# Patient Record
Sex: FEMALE | Race: BLACK OR AFRICAN AMERICAN
[De-identification: names, ages, dates, MRNs, and addresses within clinical notes are randomized per-mention and may not be internally consistent; named-entity substitution may affect disease eponyms.]

---

## 2020-06-23 ENCOUNTER — HOSPITAL ENCOUNTER (EMERGENCY)
Dept: HOSPITAL 72 - EMR | Age: 70
Discharge: HOME | End: 2020-06-23
Payer: MEDICARE

## 2020-06-23 VITALS — DIASTOLIC BLOOD PRESSURE: 61 MMHG | SYSTOLIC BLOOD PRESSURE: 136 MMHG

## 2020-06-23 VITALS — WEIGHT: 150 LBS | BODY MASS INDEX: 27.6 KG/M2 | HEIGHT: 62 IN

## 2020-06-23 DIAGNOSIS — M06.9: ICD-10-CM
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
## 2020-06-23 DIAGNOSIS — M79.7: ICD-10-CM

## 2020-06-23 DIAGNOSIS — S00.06XA: Primary | ICD-10-CM

## 2020-06-23 PROCEDURE — 99282 EMERGENCY DEPT VISIT SF MDM: CPT

## 2020-06-23 NOTE — EMERGENCY ROOM REPORT
History of Present Illness


General


Chief Complaint:  General Complaint


Source:  Patient





Present Illness


HPI


Patient presents with complaints of irritation to her scalp


Reports that several days ago she was attacked with what she felt was a swarm 

of bees


At that time she felt that she was stung several times


Now more recently she has felt that there is increased irritation throughout 

her scalp she felt that she could feel some of the stingers





Denies any fevers denies any visual changes denies any neck pain or photophobia 

denies any change in her respirations or breathing


Allergies:  


Coded Allergies:  


     ACETAMINOPHEN (Verified  Allergy, Unknown, 12/9/11)


     CODEINE (Verified  Allergy, Unknown, 12/9/11)


     HYDROCODONE (Verified  Allergy, Unknown, 12/9/11)


     HYDROMORPHONE (Verified  Allergy, Unknown, 12/9/11)


     MEPERIDINE (Verified  Allergy, Unknown, 12/9/11)


     NAPROXEN (Verified  Allergy, Unknown, 12/9/11)


     PAROXETINE (Verified  Allergy, Unknown, 12/9/11)


     PENICILLINS (Verified  Allergy, Unknown, 12/9/11)


     PROPOXYPHENE (Verified  Allergy, Unknown, 12/9/11)


     TRAMADOL (Verified  Allergy, Unknown, 12/9/11)





COVID-19 Screening


Contact w/high risk pt:  No


Recent Travel to affected area:  No


Experienced COVID-19 symptoms?:  No


COVID-19 Testing performed PTA:  Yes


COVID-19 Screening:  Negative COVID-19


COVID-19 Testing Source:  [facility]





Patient History


Past Medical History:  see triage record


Pregnant Now:  No


Reviewed Nursing Documentation:  PMH: Agreed; PSxH: Agreed





Nursing Documentation-PMH


Past Medical History:  No History, Except For


Hx Cardiac Problems:  Yes - Fibromyalgia, Rheumatoid Arthritis


Hx Hypertension:  No


Hx Pacemaker:  No


Hx Asthma:  No


Hx COPD:  No


Hx Diabetes:  No


Hx Cancer:  No


Hx Gastrointestinal Problems:  No


Hx Dialysis:  No


History Of Psychiatric Problem:  No


Hx Neurological Problems:  No


Hx Cerebrovascular Accident:  No


Hx Seizures:  No





Review of Systems


All Other Systems:  negative except mentioned in HPI





Physical Exam





Vital Signs








  Date Time  Temp Pulse Resp B/P (MAP) Pulse Ox O2 Delivery O2 Flow Rate FiO2


 


6/23/20 19:56 97.3 95 18 136/61 (86) 97 Room Air  








Sp02 EP Interpretation:  reviewed, normal


General Appearance:  well appearing, no apparent distress


Head:  normocephalic, atraumatic, other - There is some irritation noted to the 

scalp, no obvious fluctuance no obvious foreign bodies that I could see however 

there is a mild erythematous hue and irritated appearance,


Eyes:  bilateral eye PERRL, bilateral eye EOMI


ENT:  hearing grossly normal, normal pharynx, TMs + canals normal, uvula midline


Neck:  full range of motion, supple, no meningismus, no bony tend


Respiratory:  lungs clear, normal breath sounds, no rhonchi, no respiratory 

distress, no retraction, no accessory muscle use


Cardiovascular #1:  normal peripheral pulses, regular rate, rhythm, no edema, 

no gallop, no JVD, no murmur


Gastrointestinal:  normal bowel sounds, non tender, soft, no mass, no 

organomegaly, non-distended, no guarding, no hernia, no pulsatile mass, no 

rebound


Musculoskeletal:  normal inspection


Neurologic:  motor strength/tone normal, oriented x3, sensory intact, responsive


Psychiatric:  mood/affect normal


Skin:  other - As above


Lymphatic:  normal inspection, no adenopathy





Medical Decision Making


Diagnostic Impression:  


 Primary Impression:  


 insect bite


 Additional Impression:  


 local reaction


ER Course


Patient does appear to have some evidence of irritation likely reactionary from 

possible stings





Otherwise does not appear to be systemic patient will have initial conservative 

outpatient trial with medications and follow closely





Last Vital Signs








  Date Time  Temp Pulse Resp B/P (MAP) Pulse Ox O2 Delivery O2 Flow Rate FiO2


 


6/23/20 20:53 97.3 85 18 136/61 97 Room Air  








Status:  unchanged


Disposition:  HOME, SELF-CARE


Condition:  Stable


Scripts


Calamine/Zinc Oxide (CALAMINE LOTION*) 177 Ml Suspension


1 APPLIC TP BID for 7 Days, ML 0 Refills


   Prov: Erlinda Lawrence DO         6/23/20 


Diphenhydramine Hcl* (BENADRYL*) 25 Mg Capsule


25 MG ORAL Q6H PRN for Itching, #20 CAP


   Prov: Erlinda Lawrence DO         6/23/20 


Methylprednisolone (Methylprednisolone*) 4MG Dspk


4 MG ORAL AS DIRECTED for 6 Days, #21 EA 0 Refills


   Day 1: Two tablets before breakfast, one after lunch, one after


   dinner, and two at bedtime. If started late in the day, take all six


   tablets at once or divide into two or three doses, unless otherwise


   directed by prescriber.


   Day 2: One tablet before breakfast, one after lunch, one after dinner,


   and two at bedtime


   Day 3: One tablet before breakfast, one after lunch, one after dinner,


   and one at bedtime


   Day 4: One tablet before breakfast, one after lunch, and one at


   bedtime


   Day 5: One tablet before breakfast and one at bedtime


   Day 6: One tablet before breakfast


   Prov: Erlinda Lawrence DO         6/23/20


Referrals:  


[facility]


Patient Instructions:  Contact Dermatitis, Easy-to-Read, Insect Bite, Easy-to-

Read





Additional Instructions:  


Patient is provided with the discharge instructions notified to follow up with 

primary doctor in the next 2-3 days otherwise return to the er with any 

worsening symptoms.


Please note that this report is being documented using DRAGON technology.  This 

can lead to erroneous entry secondary to incorrect interpretation by the 

dictating instrument.











Erlinda Lawrence DO Jun 23, 2020 21:04

## 2020-06-26 ENCOUNTER — HOSPITAL ENCOUNTER (EMERGENCY)
Dept: HOSPITAL 72 - EMR | Age: 70
Discharge: HOME | End: 2020-06-26
Payer: MEDICARE

## 2020-06-26 VITALS — HEIGHT: 62 IN | WEIGHT: 150 LBS | BODY MASS INDEX: 27.6 KG/M2

## 2020-06-26 VITALS — DIASTOLIC BLOOD PRESSURE: 76 MMHG | SYSTOLIC BLOOD PRESSURE: 136 MMHG

## 2020-06-26 DIAGNOSIS — I87.9: ICD-10-CM

## 2020-06-26 DIAGNOSIS — S70.12XA: ICD-10-CM

## 2020-06-26 DIAGNOSIS — Z86.718: ICD-10-CM

## 2020-06-26 DIAGNOSIS — Z88.0: ICD-10-CM

## 2020-06-26 DIAGNOSIS — M79.7: ICD-10-CM

## 2020-06-26 DIAGNOSIS — M06.9: ICD-10-CM

## 2020-06-26 DIAGNOSIS — Z88.8: ICD-10-CM

## 2020-06-26 DIAGNOSIS — Z79.01: ICD-10-CM

## 2020-06-26 DIAGNOSIS — D64.9: ICD-10-CM

## 2020-06-26 DIAGNOSIS — Z86.711: ICD-10-CM

## 2020-06-26 DIAGNOSIS — Z88.6: ICD-10-CM

## 2020-06-26 DIAGNOSIS — I80.9: Primary | ICD-10-CM

## 2020-06-26 LAB
ADD MANUAL DIFF: NO
ALBUMIN SERPL-MCNC: 3.7 G/DL (ref 3.4–5)
ALBUMIN/GLOB SERPL: 0.9 {RATIO} (ref 1–2.7)
ALP SERPL-CCNC: 91 U/L (ref 46–116)
ALT SERPL-CCNC: 16 U/L (ref 12–78)
ANION GAP SERPL CALC-SCNC: 9 MMOL/L (ref 5–15)
APPEARANCE UR: CLEAR
APTT BLD: 26 SEC (ref 23–33)
APTT PPP: (no result) S
AST SERPL-CCNC: 28 U/L (ref 15–37)
BASOPHILS NFR BLD AUTO: 0.5 % (ref 0–2)
BILIRUB SERPL-MCNC: 0.3 MG/DL (ref 0.2–1)
BUN SERPL-MCNC: 18 MG/DL (ref 7–18)
CALCIUM SERPL-MCNC: 9.3 MG/DL (ref 8.5–10.1)
CHLORIDE SERPL-SCNC: 101 MMOL/L (ref 98–107)
CO2 SERPL-SCNC: 26 MMOL/L (ref 21–32)
CREAT SERPL-MCNC: 1 MG/DL (ref 0.55–1.3)
EOSINOPHIL NFR BLD AUTO: 0.1 % (ref 0–3)
ERYTHROCYTE [DISTWIDTH] IN BLOOD BY AUTOMATED COUNT: 12.6 % (ref 11.6–14.8)
GLOBULIN SER-MCNC: 4 G/DL
GLUCOSE UR STRIP-MCNC: NEGATIVE MG/DL
HCT VFR BLD CALC: 36.5 % (ref 37–47)
HGB BLD-MCNC: 11.5 G/DL (ref 12–16)
INR PPP: 1 (ref 0.9–1.1)
KETONES UR QL STRIP: NEGATIVE
LEUKOCYTE ESTERASE UR QL STRIP: NEGATIVE
LYMPHOCYTES NFR BLD AUTO: 17.3 % (ref 20–45)
MCV RBC AUTO: 95 FL (ref 80–99)
MONOCYTES NFR BLD AUTO: 4.7 % (ref 1–10)
NEUTROPHILS NFR BLD AUTO: 77.4 % (ref 45–75)
NITRITE UR QL STRIP: NEGATIVE
PH UR STRIP: 6 [PH] (ref 4.5–8)
PLATELET # BLD: 264 K/UL (ref 150–450)
POTASSIUM SERPL-SCNC: 4.1 MMOL/L (ref 3.5–5.1)
PROT UR QL STRIP: NEGATIVE
RBC # BLD AUTO: 3.83 M/UL (ref 4.2–5.4)
SODIUM SERPL-SCNC: 136 MMOL/L (ref 136–145)
SP GR UR STRIP: 1.01 (ref 1–1.03)
UROBILINOGEN UR-MCNC: NORMAL MG/DL (ref 0–1)
WBC # BLD AUTO: 7.4 K/UL (ref 4.8–10.8)

## 2020-06-26 PROCEDURE — 93005 ELECTROCARDIOGRAM TRACING: CPT

## 2020-06-26 PROCEDURE — 99284 EMERGENCY DEPT VISIT MOD MDM: CPT

## 2020-06-26 PROCEDURE — 85730 THROMBOPLASTIN TIME PARTIAL: CPT

## 2020-06-26 PROCEDURE — 36415 COLL VENOUS BLD VENIPUNCTURE: CPT

## 2020-06-26 PROCEDURE — 81003 URINALYSIS AUTO W/O SCOPE: CPT

## 2020-06-26 PROCEDURE — 84484 ASSAY OF TROPONIN QUANT: CPT

## 2020-06-26 PROCEDURE — 85610 PROTHROMBIN TIME: CPT

## 2020-06-26 PROCEDURE — 93971 EXTREMITY STUDY: CPT

## 2020-06-26 PROCEDURE — 71045 X-RAY EXAM CHEST 1 VIEW: CPT

## 2020-06-26 PROCEDURE — 83880 ASSAY OF NATRIURETIC PEPTIDE: CPT

## 2020-06-26 PROCEDURE — 85025 COMPLETE CBC W/AUTO DIFF WBC: CPT

## 2020-06-26 PROCEDURE — 80053 COMPREHEN METABOLIC PANEL: CPT

## 2020-06-26 NOTE — DIAGNOSTIC IMAGING REPORT
EXAM:

  US Duplex Right Lower Extremity Veins

 

CLINICAL HISTORY:

  DVT

 

TECHNIQUE:

  Real-time duplex ultrasound scan of the right lower extremity veins 

integrating B-mode two-dimensional vascular structure, Doppler spectral 

analysis, color flow Doppler imaging and compression.

 

COMPARISON:

  No relevant prior studies available.

 

FINDINGS:

  Deep veins:  Unremarkable as visualized.  Normal compression and normal 

response to augmentation.

  Superficial veins:  Unremarkable as visualized.

  Soft tissues:  No acute findings.

 

IMPRESSION:     

  No right lower extremity DVT.

## 2020-06-26 NOTE — NUR
ER DISCHARGE NOTE:



Patient is cleared to be discharged per ERMD, pt is aox4, on room air, with 
stable vital signs. pt was given dc and prescription instructions, pt was able 
to verbalize understanding, pt id band and iv site removed without 
complications. pt is able to ambulate with steady gait using cane. pt took all 
belongings.

## 2020-06-26 NOTE — NUR
ED Nurse Note:



Pt walked in from home c/o right leg pain with swelling since this afternoon. 
Pt has hx of DVT. AAOx4, verbally responsive. No SOB. ERMD at bedside.

## 2020-06-26 NOTE — DIAGNOSTIC IMAGING REPORT
EXAM:

  XR Chest, 1 View

 

CLINICAL HISTORY:

  SWELL

 

TECHNIQUE:

  Frontal view of the chest.

 

COMPARISON:

  12/08/11

 

FINDINGS:

  Lungs:  Bilateral pulmonary hyperinflation.  No consolidation.

  Pleural space:  No significant abnormality.  No pneumothorax.

  Heart:  No significant abnormality.  No cardiomegaly.

  Mediastinum:  No significant abnormality.

  Bones/joints:  No acute osseous abnormality.

 

IMPRESSION:     

  No acute cardiopulmonary process.

## 2020-06-26 NOTE — EMERGENCY ROOM REPORT
History of Present Illness


General


Chief Complaint:  Lower Extremity Injury


Source:  Patient





Present Illness


HPI


Patient presents with complaint of mid to upper right anterior thigh pain.  She 

noticed that today when she went to the bathroom and pulled down her pants.  

She is taking Xarelto for DVT.  She rates the pain 10/10 in her groin at this 

time.  She has not taken any medication for pain.  She is worried that she has 

recurrent of her clot.  She denies any shortness of breath but felt strange in 

her chest on the way in to the emergency department.  She denies any 

hemoptysis.  There is no increased edema of her lower leg.  She denies any 

trauma.





The patient was admitted in 2011 for dyspnea and a pulmonary embolism to this 

hospital.  Records are not available.  She takes Xarelto and has not missed any 

doses.





The patient states she is has multiple allergies to oral pain medication.  She 

states she does tolerate Tylenol without difficulty.  In addition she says that 

she balances her pain by taking gabapentin and Soma.





3 days ago the patient was seen here for insect bite to her scalp.  She was 

attacked by a swarm of bees.  She was treated with prednisone and the reaction 

was felt to be localized.  She is not complaining about this at this time.





No fevers, chills, sore throat, chest pain, palpitations, nausea, vomiting, 

diarrhea, dysuria, abdominal pain, shortness of breath, visual changes, 

dizziness, headache.


Allergies:  


Coded Allergies:  


     ACETAMINOPHEN (Verified  Allergy, Unknown, 12/9/11)


     CODEINE (Verified  Allergy, Unknown, 12/9/11)


     HYDROCODONE (Verified  Allergy, Unknown, 12/9/11)


     HYDROMORPHONE (Verified  Allergy, Unknown, 12/9/11)


     MEPERIDINE (Verified  Allergy, Unknown, 12/9/11)


     NAPROXEN (Verified  Allergy, Unknown, 12/9/11)


     PAROXETINE (Verified  Allergy, Unknown, 12/9/11)


     PENICILLINS (Verified  Allergy, Unknown, 12/9/11)


     PROPOXYPHENE (Verified  Allergy, Unknown, 12/9/11)


     TRAMADOL (Verified  Allergy, Unknown, 12/9/11)





COVID-19 Screening


Contact w/high risk pt:  No


Recent Travel to affected area:  No


Experienced COVID-19 symptoms?:  No


COVID-19 Testing performed PTA:  No





Patient History


Past Medical History:  see triage record


Social History:  Denies: smoking


Social History Narrative


Drove herself from home


Reviewed Nursing Documentation:  PMH: Agreed; PSxH: Agreed





Nursing Documentation-PMH


Past Medical History:  No History, Except For


Hx Cardiac Problems:  Yes - Fibromyalgia, Rheumatoid Arthritis


Hx Hypertension:  No


Hx Pacemaker:  No


Hx Asthma:  No


Hx COPD:  No


Hx Diabetes:  No


Hx Cancer:  No


Hx Gastrointestinal Problems:  No


Hx Dialysis:  No


Hx Neurological Problems:  No


Hx Cerebrovascular Accident:  No


Hx Seizures:  No





Review of Systems


All Other Systems:  negative except mentioned in HPI





Physical Exam





Vital Signs








  Date Time  Temp Pulse Resp B/P (MAP) Pulse Ox O2 Delivery O2 Flow Rate FiO2


 


6/26/20 18:21 98.4 87 20 136/76 (96) 99 Room Air  








Sp02 EP Interpretation:  reviewed, normal


General Appearance:  well appearing, no apparent distress, GCS 15


Head:  normocephalic


Eyes:  bilateral eye normal inspection, bilateral eye PERRL, bilateral eye EOMI


ENT:  moist mucus membranes


Neck:  supple


Respiratory:  lungs clear, normal breath sounds


Cardiovascular #1:  regular rate, rhythm


Cardiovascular #2:  2+ radial (R), 2+ dorsalis pedis (R)


Gastrointestinal:  normal inspection, normal bowel sounds, non tender, no mass, 

non-distended


Musculoskeletal:  back normal, normal range of motion, gait/station normal


Neurologic:  alert, oriented x3, grossly normal


Psychiatric:  depressed affect


Skin:  warm/dry, other - Ecchymoses or telangiectasias both medial and lateral 

side of the leg with possible either superficial phlebitis or hematoma in the 

lateral thigh





Medical Decision Making


Diagnostic Impression:  


 Primary Impression:  


 Superficial phlebitis


 Additional Impressions:  


 Hematoma


 Venous disease


ER Course


Patient presents with question of DVT in her right leg with red spots that is 

painful in her thigh.  Differential includes cellulitis, DVT, superficial 

phlebitis amongst others.  The fact she is on Xarelto makes DVT less likely at 

this time.  She is fairly adamant that there was no trauma.  Patient will be 

evaluated with labs EKG chest x-ray and noninvasive vascular study of the right 

leg.  Initially patient will be treated with Tylenol.  Cardiac monitoring is 

been ordered.





Normal white count with mild anemia.  PT PTT and INR normal which is expected 

on Xarelto.  CMP normal.





Ultrasound negative for DVT.





Patient states pain is improved after treatment.





Discussed results with patient.  Discussed treatment plan with patient.  

Discussed differential with patient.  Advised patient that she was being 

observed as an outpatient and it was important for her to follow-up with her 

own doctor.





Patient stable for outpatient observation and treatment.





Laboratory Tests








Test


  6/26/20


19:15 6/26/20


19:21


 


Urine Color Pale yellow   


 


Urine Appearance Clear   


 


Urine pH 6 (4.5-8.0)   


 


Urine Specific Gravity


  1.010


(1.005-1.035) 


 


 


Urine Protein


  Negative


(NEGATIVE) 


 


 


Urine Glucose (UA)


  Negative


(NEGATIVE) 


 


 


Urine Ketones


  Negative


(NEGATIVE) 


 


 


Urine Blood


  Negative


(NEGATIVE) 


 


 


Urine Nitrite


  Negative


(NEGATIVE) 


 


 


Urine Bilirubin


  Negative


(NEGATIVE) 


 


 


Urine Urobilinogen


  Normal MG/DL


(0.0-1.0) 


 


 


Urine Leukocyte Esterase


  Negative


(NEGATIVE) 


 


 


White Blood Count


  


  7.4 K/UL


(4.8-10.8)


 


Red Blood Count


  


  3.83 M/UL


(4.20-5.40)  L


 


Hemoglobin


  


  11.5 G/DL


(12.0-16.0)  L


 


Hematocrit


  


  36.5 %


(37.0-47.0)  L


 


Mean Corpuscular Volume  95 FL (80-99)  


 


Mean Corpuscular Hemoglobin


  


  30.1 PG


(27.0-31.0)


 


Mean Corpuscular Hemoglobin


Concent 


  31.6 G/DL


(32.0-36.0)  L


 


Red Cell Distribution Width


  


  12.6 %


(11.6-14.8)


 


Platelet Count


  


  264 K/UL


(150-450)


 


Mean Platelet Volume


  


  6.7 FL


(6.5-10.1)


 


Neutrophils (%) (Auto)


  


  77.4 %


(45.0-75.0)  H


 


Lymphocytes (%) (Auto)


  


  17.3 %


(20.0-45.0)  L


 


Monocytes (%) (Auto)


  


  4.7 %


(1.0-10.0)


 


Eosinophils (%) (Auto)


  


  0.1 %


(0.0-3.0)


 


Basophils (%) (Auto)


  


  0.5 %


(0.0-2.0)


 


Prothrombin Time


  


  11.2 SEC


(9.30-11.50)


 


Prothrombin Time INR  1.0 (0.9-1.1)  


 


Activated Partial


Thromboplast Time 


  26 SEC (23-33)


 


 


Sodium Level


  


  136 MMOL/L


(136-145)


 


Potassium Level


  


  4.1 MMOL/L


(3.5-5.1)


 


Chloride Level


  


  101 MMOL/L


()


 


Carbon Dioxide Level


  


  26 MMOL/L


(21-32)


 


Anion Gap


  


  9 mmol/L


(5-15)


 


Blood Urea Nitrogen


  


  18 mg/dL


(7-18)


 


Creatinine


  


  1.0 MG/DL


(0.55-1.30)


 


Estimated Glomerular


Filtration Rate 


  > 60 mL/min


(>60)


 


Glucose Level


  


  99 MG/DL


()


 


Calcium Level


  


  9.3 MG/DL


(8.5-10.1)


 


Total Bilirubin


  


  0.3 MG/DL


(0.2-1.0)


 


Aspartate Amino Transferase


(AST) 


  28 U/L (15-37)


 


 


Alanine Aminotransferase (ALT)


  


  16 U/L (12-78)


 


 


Alkaline Phosphatase


  


  91 U/L


()


 


Troponin I


  


  0.000 ng/mL


(0.000-0.056)


 


Pro-B-Type Natriuretic Peptide


  


  109 pg/mL


(0-125)


 


Total Protein


  


  7.7 G/DL


(6.4-8.2)


 


Albumin


  


  3.7 G/DL


(3.4-5.0)


 


Globulin  4.0 g/dL  


 


Albumin/Globulin Ratio


  


  0.9 (1.0-2.7)


L








EKG Diagnostic Results


Rate:  normal


Rhythm:  NSR


ST Segments:  no acute changes





Rhythm Strip Diag. Results


EP Interpretation:  yes


Rhythm:  NSR, no PVC's, no ectopy





CT/MRI/US Diagnostic Results


CT/MRI/US Diagnostic Results :  


   Imaging Test Ordered:  Right leg venous Doppler


   Impression


No DVT





Last Vital Signs








  Date Time  Temp Pulse Resp B/P (MAP) Pulse Ox O2 Delivery O2 Flow Rate FiO2


 


6/26/20 20:07 98.5  20 136/76 99 Room Air  


 


6/26/20 18:21  87      








Status:  improved


Disposition:  HOME, SELF-CARE


Condition:  Improved











Luis Moser MD Jun 26, 2020 18:44

## 2020-09-17 ENCOUNTER — HOSPITAL ENCOUNTER (EMERGENCY)
Dept: HOSPITAL 72 - EMR | Age: 70
Discharge: HOME | End: 2020-09-17
Payer: MEDICARE

## 2020-09-17 VITALS — DIASTOLIC BLOOD PRESSURE: 78 MMHG | SYSTOLIC BLOOD PRESSURE: 109 MMHG

## 2020-09-17 VITALS — SYSTOLIC BLOOD PRESSURE: 109 MMHG | DIASTOLIC BLOOD PRESSURE: 78 MMHG

## 2020-09-17 VITALS — WEIGHT: 150 LBS | HEIGHT: 62 IN | BODY MASS INDEX: 27.6 KG/M2

## 2020-09-17 DIAGNOSIS — M79.7: ICD-10-CM

## 2020-09-17 DIAGNOSIS — Z88.5: ICD-10-CM

## 2020-09-17 DIAGNOSIS — Z88.8: ICD-10-CM

## 2020-09-17 DIAGNOSIS — M06.9: ICD-10-CM

## 2020-09-17 DIAGNOSIS — R21: Primary | ICD-10-CM

## 2020-09-17 DIAGNOSIS — Z88.1: ICD-10-CM

## 2020-09-17 PROCEDURE — 99282 EMERGENCY DEPT VISIT SF MDM: CPT

## 2020-09-17 NOTE — EMERGENCY ROOM REPORT
History of Present Illness


General


Chief Complaint:  Skin Rash/Abscess


Source:  Patient





Present Illness


HPI


70-year-old female history of bites to the forehead, reports recurrent bites x1 

day, she endorses itching, she states alleviating factors appear to be steroids,

she said she had a Medrol Dosepak in January and it helped, no aggravating 

factors severity is mild, constant characterization is itchy, patient presents 

for evaluation and treatment no other complaints


Allergies:  


Coded Allergies:  


     CODEINE (Verified  Allergy, Unknown, 12/9/11)


     HYDROCODONE (Verified  Allergy, Unknown, 12/9/11)


     HYDROMORPHONE (Verified  Allergy, Unknown, 12/9/11)


     MEPERIDINE (Verified  Allergy, Unknown, 12/9/11)


     NAPROXEN (Verified  Allergy, Unknown, 12/9/11)


     PAROXETINE (Verified  Allergy, Unknown, 12/9/11)


     PENICILLINS (Verified  Allergy, Unknown, 12/9/11)


     PROPOXYPHENE (Verified  Allergy, Unknown, 12/9/11)


     TRAMADOL (Verified  Allergy, Unknown, 12/9/11)





COVID-19 Screening


Contact w/high risk pt:  No


Recent Travel to affected area:  No


Experienced COVID-19 symptoms?:  No


COVID-19 Testing performed PTA:  No





Patient History


Past Medical History:  see triage record


Last Menstrual Period:  na


Reviewed Nursing Documentation:  PMH: Agreed; PSxH: Agreed





Nursing Documentation-PMH


Past Medical History:  No History, Except For


Hx Cardiac Problems:  Yes - Fibromyalgia, Rheumatoid Arthritis


Hx Hypertension:  No


Hx Pacemaker:  No


Hx Asthma:  No


Hx COPD:  No


Hx Diabetes:  No


Hx Cancer:  No


Hx Gastrointestinal Problems:  No


Hx Dialysis:  No


Hx Neurological Problems:  No


Hx Cerebrovascular Accident:  No


Hx Seizures:  No





Review of Systems


All Other Systems:  negative except mentioned in HPI





Physical Exam





Vital Signs








  Date Time  Temp Pulse Resp B/P (MAP) Pulse Ox O2 Delivery O2 Flow Rate FiO2


 


9/17/20 10:04 97.7 94 18 109/78 (88) 97 Room Air  








General Appearance:  well appearing, no apparent distress


Head:  normocephalic, atraumatic


ENT:  hearing grossly normal, normal voice


Neck:  full range of motion, supple


Respiratory:  no respiratory distress, speaking full sentences


Neurologic:  alert, normal gait


Psychiatric:  mood/affect normal


Skin:  rash - Small punctate bite marks less than a millimeter left forehead





Medical Decision Making


Diagnostic Impression:  


   Primary Impression:  


   Rash and other nonspecific skin eruption


ER Course


[age]-year-old female presents with possible bite marks, will start patient on the

Medrol dose pack per her request


Disposition home with return precautions follow-up with PCP





Last Vital Signs








  Date Time  Temp Pulse Resp B/P (MAP) Pulse Ox O2 Delivery O2 Flow Rate FiO2


 


9/17/20 10:04 97.7 94 18 109/78 (88) 97 Room Air  








Disposition:  HOME, SELF-CARE


Condition:  Stable


Scripts


Diphenhydramine HCl/Zinc Acet (Benadryl Itch Stopping Crm) 28.3 Gm Cream..g.


1 APPLIC TOPIC DAILY PRN for Itching, #28.3 APPLIC


   Prov: Yovani Nance MD         9/17/20 


Methylprednisolone (Methylprednisolone*) 4MG Dspk


4 MG ORAL AS DIRECTED for 6 Days, #21 EA 0 Refills


   Day 1: Two tablets before breakfast, one after lunch, one after


   dinner, and two at bedtime. If started late in the day, take all six


   tablets at once or divide into two or three doses, unless otherwise


   directed by prescriber.


   Day 2: One tablet before breakfast, one after lunch, one after dinner,


   and two at bedtime


   Day 3: One tablet before breakfast, one after lunch, one after dinner,


   and one at bedtime


   Day 4: One tablet before breakfast, one after lunch, and one at


   bedtime


   Day 5: One tablet before breakfast and one at bedtime


   Day 6: One tablet before breakfast


   Prov: Yovani Nance MD         9/17/20


Referrals:  


[facility]. [facility]


Patient Instructions:  Rash





Additional Instructions:  


The patient was provided with discharge instructions, notified to follow-up with

a primary care doctor and or specialist in the next 24-48 hours, and to return 

to the ED if they have worsening of their symptoms. 





Please note that this report is being documented using DRAGON technology.


This can lead to erroneous entry secondary to incorrect interpretation by the 

dictating instrument.











Yovani Nance MD          Sep 17, 2020 10:21

## 2020-11-01 ENCOUNTER — HOSPITAL ENCOUNTER (EMERGENCY)
Dept: HOSPITAL 72 - EMR | Age: 70
Discharge: HOME | End: 2020-11-01
Payer: MEDICARE

## 2020-11-01 VITALS — HEIGHT: 62 IN | BODY MASS INDEX: 27.6 KG/M2 | WEIGHT: 150 LBS

## 2020-11-01 VITALS — DIASTOLIC BLOOD PRESSURE: 73 MMHG | SYSTOLIC BLOOD PRESSURE: 114 MMHG

## 2020-11-01 VITALS — SYSTOLIC BLOOD PRESSURE: 125 MMHG | DIASTOLIC BLOOD PRESSURE: 76 MMHG

## 2020-11-01 DIAGNOSIS — M79.7: ICD-10-CM

## 2020-11-01 DIAGNOSIS — Z88.0: ICD-10-CM

## 2020-11-01 DIAGNOSIS — Z88.8: ICD-10-CM

## 2020-11-01 DIAGNOSIS — M06.9: ICD-10-CM

## 2020-11-01 DIAGNOSIS — Z88.6: ICD-10-CM

## 2020-11-01 DIAGNOSIS — R21: Primary | ICD-10-CM

## 2020-11-01 PROCEDURE — 99282 EMERGENCY DEPT VISIT SF MDM: CPT

## 2020-11-01 NOTE — NUR
ED Nurse Note:



Patient from home and walked in due to generalized body rashes and urticaria x 
1 week. No respiratory distress. AAO x4 and ambulatory.

## 2020-11-01 NOTE — EMERGENCY ROOM REPORT
History of Present Illness


General


Chief Complaint:  Skin Rash/Abscess


Source:  Patient





Present Illness


HPI


Patient presents emergency department today complaining of rash and itching 

throughout the body.  Patient has been to our hospital multiple times for 

different types of complaints before.  Last time she was here she requested 

Medrol Dosepak.  She states that this seems to help her with her symptoms.  She 

is requesting again Medrol Dosepak.  She also thinks that she might have scabies

or mites throughout her body would like appointment for that.  She denies any 

fever chest pain shortness of breath.  No other complaints were noted.  Symptoms

noted to be mild to moderate over the last couple of days progressively worse.  

Denies any other sick contacts or travel to any high risk places.  No other 

modifying factors.  No other associated signs and symptoms.  No other complaints

were noted.


Allergies:  


Coded Allergies:  


     CODEINE (Verified  Allergy, Unknown, 12/9/11)


     HYDROCODONE (Verified  Allergy, Unknown, 12/9/11)


     HYDROMORPHONE (Verified  Allergy, Unknown, 12/9/11)


     MEPERIDINE (Verified  Allergy, Unknown, 12/9/11)


     NAPROXEN (Verified  Allergy, Unknown, 12/9/11)


     PAROXETINE (Verified  Allergy, Unknown, 12/9/11)


     PENICILLINS (Verified  Allergy, Unknown, 12/9/11)


     PROPOXYPHENE (Verified  Allergy, Unknown, 12/9/11)


     TRAMADOL (Verified  Allergy, Unknown, 12/9/11)





COVID-19 Screening


Contact w/high risk pt:  No


Recent Travel to affected area:  No


Experienced COVID-19 symptoms?:  No


COVID-19 Testing performed PTA:  Yes


COVID-19 Screening:  Negative COVID-19


COVID-19 Testing Source:  NP.





Patient History


Past Medical History:  other - Fibromyalgia, rheumatoid arthritis.


Past Surgical History:  none


Social History:  Denies: smoking, alcohol use, drug use


Reviewed Nursing Documentation:  PMH: Agreed; PSxH: Agreed





Nursing Documentation-PMH


Past Medical History:  No History, Except For


Hx Cardiac Problems:  Yes - Fibromyalgia, Rheumatoid Arthritis


Hx Hypertension:  No


Hx Pacemaker:  No


Hx Asthma:  No


Hx COPD:  No


Hx Diabetes:  No


Hx Cancer:  No


Hx Gastrointestinal Problems:  No


Hx Dialysis:  No


Hx Neurological Problems:  No


Hx Cerebrovascular Accident:  No


Hx Seizures:  No





Review of Systems


All Other Systems:  negative except mentioned in HPI





Physical Exam





Vital Signs








  Date Time  Temp Pulse Resp B/P (MAP) Pulse Ox O2 Delivery O2 Flow Rate FiO2


 


11/1/20 10:14 98.1 98 16 114/73 (87) 98 Room Air  








Sp02 EP Interpretation:  reviewed, normal


General Appearance:  normal inspection, well appearing, no apparent distress, 

alert


Head:  atraumatic


Eyes:  bilateral eye normal inspection


ENT:  normal ENT inspection, hearing grossly normal, normal voice


Neck:  normal inspection, full range of motion, supple, no bony tend


Respiratory:  normal inspection, lungs clear, normal breath sounds, no 

respiratory distress, no retraction, no wheezing


Cardiovascular #1:  regular rate, rhythm, no edema


Gastrointestinal:  normal inspection, normal bowel sounds, non tender, soft, no 

guarding, no hernia


Genitourinary:  no CVA tenderness


Musculoskeletal:  normal inspection, back normal, normal range of motion


Neurologic:  alert, responsive, speech normal, normal inspection


Psychiatric:  normal inspection, judgement/insight normal, mood/affect normal





Medical Decision Making


Diagnostic Impression:  


   Primary Impression:  


   Rash and other nonspecific skin eruption


ER Course


Patient presents emergency department today complaint of rash on her body and 

itching.  Her exam is fairly normal.  Differential diagnosis include pleuritis, 

allergic reaction, eczema, scabies just name a few.  Given patient presentation 

this can be consistent with scabies although other allergic reactions are 

possible.  Will provide patient prescription for Medrol Dosepak and permethrin 

cream.  Recommend outpatient follow-up.  Patient is advised to follow up with 

primary doctor in 2-3 days and return the emergency room for any worsening 

symptoms and as needed.





Last Vital Signs








  Date Time  Temp Pulse Resp B/P (MAP) Pulse Ox O2 Delivery O2 Flow Rate FiO2


 


11/1/20 10:14 98.1 98 16 114/73 (87) 98 Room Air  








Status:  improved


Disposition:  HOME, SELF-CARE


Condition:  Stable


Scripts


Methylprednisolone (Methylprednisolone*) 4MG Dspk


4 MG ORAL AS DIRECTED for 6 Days, #21 EA 0 Refills


   Day 1: Two tablets before breakfast, one after lunch, one after


   dinner, and two at bedtime. If started late in the day, take all six


   tablets at once or divide into two or three doses, unless otherwise


   directed by prescriber.


   Day 2: One tablet before breakfast, one after lunch, one after dinner,


   and two at bedtime


   Day 3: One tablet before breakfast, one after lunch, one after dinner,


   and one at bedtime


   Day 4: One tablet before breakfast, one after lunch, and one at


   bedtime


   Day 5: One tablet before breakfast and one at bedtime


   Day 6: One tablet before breakfast


   Prov: Jacob Morejon MD         11/1/20 


Permethrin* (ELIMITE*) 60 Gm Cream..g.


1 APPLIC TOPIC ONCE, #60 GM 0 Refills


   Apply cream from head to toe; leave on for 8-14 hours before washing


   off with


   water; may reapply in 1 week if live mites appear.


   Prov: Jacob Morejon MD         11/1/20


Patient Instructions:  Pruritus











Jacob Morejon MD                 Nov 1, 2020 10:30